# Patient Record
Sex: MALE | Race: WHITE | NOT HISPANIC OR LATINO | URBAN - METROPOLITAN AREA
[De-identification: names, ages, dates, MRNs, and addresses within clinical notes are randomized per-mention and may not be internally consistent; named-entity substitution may affect disease eponyms.]

---

## 2019-05-08 ENCOUNTER — NEW PATIENT (OUTPATIENT)
Dept: URBAN - METROPOLITAN AREA CLINIC 51 | Facility: CLINIC | Age: 29
End: 2019-05-08
Payer: COMMERCIAL

## 2019-05-08 PROCEDURE — 92015 DETERMINE REFRACTIVE STATE: CPT | Performed by: OPTOMETRIST

## 2019-05-08 PROCEDURE — 92004 COMPRE OPH EXAM NEW PT 1/>: CPT | Performed by: OPTOMETRIST

## 2019-05-08 ASSESSMENT — INTRAOCULAR PRESSURE
OD: 14
OS: 15

## 2019-05-08 ASSESSMENT — KERATOMETRY
OD: 42.14
OS: 41.93

## 2019-05-08 ASSESSMENT — VISUAL ACUITY
OS: 20/15
OD: 20/15

## 2021-04-27 DIAGNOSIS — H52.13 MYOPIA, BILATERAL: ICD-10-CM

## 2021-04-27 DIAGNOSIS — H04.123 TEAR FILM INSUFFICIENCY OF BILATERAL LACRIMAL GLANDS: Primary | ICD-10-CM

## 2021-04-27 DIAGNOSIS — H52.4 PRESBYOPIA: ICD-10-CM

## 2021-04-27 PROCEDURE — 92310 CONTACT LENS FITTING OU: CPT | Performed by: OPTOMETRIST

## 2021-04-27 PROCEDURE — 92014 COMPRE OPH EXAM EST PT 1/>: CPT | Performed by: OPTOMETRIST

## 2021-04-27 PROCEDURE — 92015 DETERMINE REFRACTIVE STATE: CPT | Performed by: OPTOMETRIST

## 2021-04-27 ASSESSMENT — VISUAL ACUITY
OS: 20/20
OD: 20/20

## 2021-04-27 ASSESSMENT — INTRAOCULAR PRESSURE
OD: 13
OS: 14

## 2021-04-27 ASSESSMENT — KERATOMETRY
OS: 42.06
OD: 40.13

## 2021-04-27 NOTE — IMPRESSION/PLAN
Impression: Tear film insufficiency of bilateral lacrimal glands
--Patient wears contact lenses
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  
--incomplete blink OU Plan: Due to incomplete blink, recommend patient to use preservative-free ATs QID or more OU

## 2021-04-27 NOTE — IMPRESSION/PLAN
Impression: Myopia, bilateral Plan: CLRx update to:
Air Optix + Hydraglyde/-5.00 SPH OU/8.6 BC Pt STRESSED importance of proper wear schedules as he notes two boxes can last me a year. Released MRx

## 2023-02-24 ENCOUNTER — OFFICE VISIT (OUTPATIENT)
Dept: URBAN - METROPOLITAN AREA CLINIC 51 | Facility: CLINIC | Age: 33
End: 2023-02-24
Payer: COMMERCIAL

## 2023-02-24 DIAGNOSIS — H52.13 MYOPIA, BILATERAL: ICD-10-CM

## 2023-02-24 DIAGNOSIS — H04.123 TEAR FILM INSUFFICIENCY OF BILATERAL LACRIMAL GLANDS: Primary | ICD-10-CM

## 2023-02-24 PROCEDURE — 99214 OFFICE O/P EST MOD 30 MIN: CPT | Performed by: OPTOMETRIST

## 2023-02-24 ASSESSMENT — KERATOMETRY
OD: 42.13
OS: 42.00

## 2023-02-24 ASSESSMENT — VISUAL ACUITY
OD: 20/20
OS: 20/20

## 2023-02-24 ASSESSMENT — INTRAOCULAR PRESSURE
OD: 14
OS: 14

## 2023-02-24 NOTE — IMPRESSION/PLAN
Impression: Tear film insufficiency of bilateral lacrimal glands *Patient wears contact lenses *Patient works on computer 6-7 hours a day.  
*incomplete blink OU Plan: Due to incomplete blink, recommend patient to use preservative-free ATs QID or more OU